# Patient Record
Sex: MALE | Race: WHITE | NOT HISPANIC OR LATINO | Employment: FULL TIME | URBAN - METROPOLITAN AREA
[De-identification: names, ages, dates, MRNs, and addresses within clinical notes are randomized per-mention and may not be internally consistent; named-entity substitution may affect disease eponyms.]

---

## 2023-09-14 ENCOUNTER — OFFICE VISIT (OUTPATIENT)
Dept: PRIMARY CARE | Facility: CLINIC | Age: 58
End: 2023-09-14
Payer: COMMERCIAL

## 2023-09-14 ENCOUNTER — LAB (OUTPATIENT)
Dept: LAB | Facility: LAB | Age: 58
End: 2023-09-14
Payer: COMMERCIAL

## 2023-09-14 VITALS
WEIGHT: 151.9 LBS | HEIGHT: 70 IN | OXYGEN SATURATION: 99 % | TEMPERATURE: 97.8 F | SYSTOLIC BLOOD PRESSURE: 120 MMHG | DIASTOLIC BLOOD PRESSURE: 80 MMHG | BODY MASS INDEX: 21.75 KG/M2 | HEART RATE: 71 BPM

## 2023-09-14 DIAGNOSIS — Z23 ENCOUNTER FOR IMMUNIZATION: ICD-10-CM

## 2023-09-14 DIAGNOSIS — I10 BENIGN ESSENTIAL HYPERTENSION: ICD-10-CM

## 2023-09-14 DIAGNOSIS — Z12.5 PROSTATE CANCER SCREENING: ICD-10-CM

## 2023-09-14 DIAGNOSIS — I10 BENIGN ESSENTIAL HYPERTENSION: Primary | ICD-10-CM

## 2023-09-14 PROBLEM — I51.7 LEFT VENTRICULAR HYPERTROPHY: Status: ACTIVE | Noted: 2023-09-14

## 2023-09-14 LAB
ALANINE AMINOTRANSFERASE (SGPT) (U/L) IN SER/PLAS: 21 U/L (ref 10–52)
ALBUMIN (G/DL) IN SER/PLAS: 4.6 G/DL (ref 3.4–5)
ALKALINE PHOSPHATASE (U/L) IN SER/PLAS: 77 U/L (ref 33–120)
ANION GAP IN SER/PLAS: 11 MMOL/L (ref 10–20)
ASPARTATE AMINOTRANSFERASE (SGOT) (U/L) IN SER/PLAS: 16 U/L (ref 9–39)
BASOPHILS (10*3/UL) IN BLOOD BY AUTOMATED COUNT: 0.05 X10E9/L (ref 0–0.1)
BASOPHILS/100 LEUKOCYTES IN BLOOD BY AUTOMATED COUNT: 0.8 % (ref 0–2)
BILIRUBIN TOTAL (MG/DL) IN SER/PLAS: 0.6 MG/DL (ref 0–1.2)
CALCIUM (MG/DL) IN SER/PLAS: 10.1 MG/DL (ref 8.6–10.6)
CARBON DIOXIDE, TOTAL (MMOL/L) IN SER/PLAS: 34 MMOL/L (ref 21–32)
CHLORIDE (MMOL/L) IN SER/PLAS: 99 MMOL/L (ref 98–107)
CHOLESTEROL (MG/DL) IN SER/PLAS: 223 MG/DL (ref 0–199)
CHOLESTEROL IN HDL (MG/DL) IN SER/PLAS: 51.6 MG/DL
CHOLESTEROL/HDL RATIO: 4.3
CREATININE (MG/DL) IN SER/PLAS: 1.09 MG/DL (ref 0.5–1.3)
EOSINOPHILS (10*3/UL) IN BLOOD BY AUTOMATED COUNT: 0.23 X10E9/L (ref 0–0.7)
EOSINOPHILS/100 LEUKOCYTES IN BLOOD BY AUTOMATED COUNT: 3.5 % (ref 0–6)
ERYTHROCYTE DISTRIBUTION WIDTH (RATIO) BY AUTOMATED COUNT: 11.9 % (ref 11.5–14.5)
ERYTHROCYTE MEAN CORPUSCULAR HEMOGLOBIN CONCENTRATION (G/DL) BY AUTOMATED: 33.9 G/DL (ref 32–36)
ERYTHROCYTE MEAN CORPUSCULAR VOLUME (FL) BY AUTOMATED COUNT: 96 FL (ref 80–100)
ERYTHROCYTES (10*6/UL) IN BLOOD BY AUTOMATED COUNT: 4.9 X10E12/L (ref 4.5–5.9)
GFR MALE: 80 ML/MIN/1.73M2
GLUCOSE (MG/DL) IN SER/PLAS: 85 MG/DL (ref 74–99)
HEMATOCRIT (%) IN BLOOD BY AUTOMATED COUNT: 47.2 % (ref 41–52)
HEMOGLOBIN (G/DL) IN BLOOD: 16 G/DL (ref 13.5–17.5)
IMMATURE GRANULOCYTES/100 LEUKOCYTES IN BLOOD BY AUTOMATED COUNT: 0.3 % (ref 0–0.9)
LDL: 151 MG/DL (ref 0–99)
LEUKOCYTES (10*3/UL) IN BLOOD BY AUTOMATED COUNT: 6.5 X10E9/L (ref 4.4–11.3)
LYMPHOCYTES (10*3/UL) IN BLOOD BY AUTOMATED COUNT: 1.31 X10E9/L (ref 1.2–4.8)
LYMPHOCYTES/100 LEUKOCYTES IN BLOOD BY AUTOMATED COUNT: 20.1 % (ref 13–44)
MONOCYTES (10*3/UL) IN BLOOD BY AUTOMATED COUNT: 0.52 X10E9/L (ref 0.1–1)
MONOCYTES/100 LEUKOCYTES IN BLOOD BY AUTOMATED COUNT: 8 % (ref 2–10)
NEUTROPHILS (10*3/UL) IN BLOOD BY AUTOMATED COUNT: 4.39 X10E9/L (ref 1.2–7.7)
NEUTROPHILS/100 LEUKOCYTES IN BLOOD BY AUTOMATED COUNT: 67.3 % (ref 40–80)
NRBC (PER 100 WBCS) BY AUTOMATED COUNT: 0 /100 WBC (ref 0–0)
PLATELETS (10*3/UL) IN BLOOD AUTOMATED COUNT: 273 X10E9/L (ref 150–450)
POTASSIUM (MMOL/L) IN SER/PLAS: 4 MMOL/L (ref 3.5–5.3)
PROSTATE SPECIFIC AG (NG/ML) IN SER/PLAS: 0.97 NG/ML (ref 0–4)
PROTEIN TOTAL: 7.9 G/DL (ref 6.4–8.2)
SODIUM (MMOL/L) IN SER/PLAS: 140 MMOL/L (ref 136–145)
TRIGLYCERIDE (MG/DL) IN SER/PLAS: 103 MG/DL (ref 0–149)
UREA NITROGEN (MG/DL) IN SER/PLAS: 13 MG/DL (ref 6–23)
VLDL: 21 MG/DL (ref 0–40)

## 2023-09-14 PROCEDURE — 3074F SYST BP LT 130 MM HG: CPT | Performed by: INTERNAL MEDICINE

## 2023-09-14 PROCEDURE — 1036F TOBACCO NON-USER: CPT | Performed by: INTERNAL MEDICINE

## 2023-09-14 PROCEDURE — 84153 ASSAY OF PSA TOTAL: CPT

## 2023-09-14 PROCEDURE — 90471 IMMUNIZATION ADMIN: CPT | Performed by: INTERNAL MEDICINE

## 2023-09-14 PROCEDURE — 3079F DIAST BP 80-89 MM HG: CPT | Performed by: INTERNAL MEDICINE

## 2023-09-14 PROCEDURE — 80061 LIPID PANEL: CPT

## 2023-09-14 PROCEDURE — 85025 COMPLETE CBC W/AUTO DIFF WBC: CPT

## 2023-09-14 PROCEDURE — 80053 COMPREHEN METABOLIC PANEL: CPT

## 2023-09-14 PROCEDURE — 99212 OFFICE O/P EST SF 10 MIN: CPT | Performed by: INTERNAL MEDICINE

## 2023-09-14 PROCEDURE — 90686 IIV4 VACC NO PRSV 0.5 ML IM: CPT | Performed by: INTERNAL MEDICINE

## 2023-09-14 PROCEDURE — 36415 COLL VENOUS BLD VENIPUNCTURE: CPT

## 2023-09-14 RX ORDER — VALSARTAN AND HYDROCHLOROTHIAZIDE 320; 12.5 MG/1; MG/1
1 TABLET, FILM COATED ORAL DAILY
COMMUNITY
Start: 2021-08-23 | End: 2023-09-14 | Stop reason: SDUPTHER

## 2023-09-14 RX ORDER — VALSARTAN AND HYDROCHLOROTHIAZIDE 320; 12.5 MG/1; MG/1
1 TABLET, FILM COATED ORAL DAILY
Qty: 90 TABLET | Refills: 3 | Status: SHIPPED | OUTPATIENT
Start: 2023-09-14 | End: 2024-09-13

## 2023-09-14 RX ORDER — AMLODIPINE BESYLATE 10 MG/1
10 TABLET ORAL DAILY
Qty: 90 TABLET | Refills: 3 | Status: SHIPPED | OUTPATIENT
Start: 2023-09-14 | End: 2024-09-13

## 2023-09-14 RX ORDER — AMLODIPINE BESYLATE 10 MG/1
1 TABLET ORAL DAILY
COMMUNITY
Start: 2021-06-29 | End: 2023-09-14 | Stop reason: SDUPTHER

## 2023-09-14 ASSESSMENT — PATIENT HEALTH QUESTIONNAIRE - PHQ9
2. FEELING DOWN, DEPRESSED OR HOPELESS: NOT AT ALL
1. LITTLE INTEREST OR PLEASURE IN DOING THINGS: NOT AT ALL
SUM OF ALL RESPONSES TO PHQ9 QUESTIONS 1 AND 2: 0

## 2023-09-14 NOTE — PROGRESS NOTES
Patient ID: Dwight Gore is a 58 y.o. male who presents for bp check  HPI  Taking  blood pressure medications.  Denies headache, dizziness, chest pain, shortness of breath or palpitation.  No exertional chest pain or dizziness or palpitation.  Has had eye exam within 1 year.  No blurred vision or double vision  Exercising  Following DASH diet  On tlc for lowering ldl    Review of Systems  GENERAL;:Denies weight changes,fatigue,fever,chills or sweats  HEENT:Denies headache,sorethroat,sinus drainage ,vision or hearing issues  CVS:No chest pain,sob or palpitation.No leg swelling  RESP:No c/c cough,cp,sob or wheezing  GI:NO abdominal pain,nausea,heartburn,vomiting,or bowel changes.  :No painful urination,frequency or hematuria.No incontinence  MSK:No joint pain or swelling  NEURO:No dizziness,weakness,numbness or tingling.No memory issues  PSYCH:No anxiety,depression or sleep issues       HISTORY  Social History     Socioeconomic History    Marital status: Single     Spouse name: Not on file    Number of children: Not on file    Years of education: Not on file    Highest education level: Not on file   Occupational History    Not on file   Tobacco Use    Smoking status: Never    Smokeless tobacco: Never   Substance and Sexual Activity    Alcohol use: Not Currently    Drug use: Never    Sexual activity: Not on file   Other Topics Concern    Not on file   Social History Narrative    Not on file     Social Determinants of Health     Financial Resource Strain: Not on file   Food Insecurity: Not on file   Transportation Needs: Not on file   Physical Activity: Not on file   Stress: Not on file   Social Connections: Not on file   Intimate Partner Violence: Not on file   Housing Stability: Not on file     No family history on file.  Past Medical History:   Diagnosis Date    Elevated blood-pressure reading, without diagnosis of hypertension 06/29/2021    Elevated blood pressure reading    Other specified health status     No  "known problems    Personal history of other diseases of the circulatory system 08/23/2021    History of uncontrolled hypertension     Past Surgical History:   Procedure Laterality Date    OTHER SURGICAL HISTORY  06/29/2021    Cataract surgery       Blood pressure 120/80, pulse 71, temperature 36.6 °C (97.8 °F), height 1.778 m (5' 10\"), weight 68.9 kg (151 lb 14.4 oz), SpO2 99 %.       Physical Exam  Gen. patient is not in acute distress  HEENT: No pallor or icterus.  Neck: Supple,no lAD,No JVD  CVS: S1, S2, regular in rate and rhythm. No peripheral edema.  Chest: Clear to auscultation bilateral. Good air entry.  Abd:Soft,nontender  Extremities no edema or tenderness.  Neuro: Grossly nonfocal, alert and oriented.  Psych: Mood and affect normal, good judgment and insight         Assessment/Plan   Problem List Items Addressed This Visit       Benign essential hypertension - Primary     Blood pressure is well controlled on treatment.  Continue same.  Check labs         Relevant Medications    amLODIPine (Norvasc) 10 mg tablet    valsartan-hydrochlorothiazide (Diovan-HCT) 320-12.5 mg tablet    Other Relevant Orders    CBC and Auto Differential    Comprehensive Metabolic Panel    Lipid Panel     Other Visit Diagnoses       Prostate cancer screening        Relevant Orders    PSA    Encounter for immunization        Relevant Orders    Flu vaccine (IIV4) age 6 months and greater, preservative free        Check lipid profile.  PSA for screening.  He declines colonoscopy or Cologuard at this time.  He will call me back when ready  Recommended to get Tdap and Shingrix.        Nathalia Christensen MD   "

## 2023-09-15 ENCOUNTER — TELEPHONE (OUTPATIENT)
Dept: PRIMARY CARE | Facility: CLINIC | Age: 58
End: 2023-09-15
Payer: COMMERCIAL

## 2023-09-15 NOTE — TELEPHONE ENCOUNTER
Left pt a VM to call back    Results     Advice blood work is ok except cholesterol and LDL cholesterol has gone up.Increase fiber in diet.Decrease saturated fats like oils,cheese,animal products,processed foods

## 2023-09-15 NOTE — RESULT ENCOUNTER NOTE
Advice blood work is ok except cholesterol and LDL cholesterol has gone up.Increase fiber in diet.Decrease saturated fats like oils,cheese,animal products,processed foods

## 2024-03-14 ENCOUNTER — APPOINTMENT (OUTPATIENT)
Dept: PRIMARY CARE | Facility: CLINIC | Age: 59
End: 2024-03-14
Payer: COMMERCIAL

## 2024-05-16 ENCOUNTER — OFFICE VISIT (OUTPATIENT)
Dept: PRIMARY CARE | Facility: CLINIC | Age: 59
End: 2024-05-16
Payer: COMMERCIAL

## 2024-05-16 VITALS
HEIGHT: 70 IN | HEART RATE: 85 BPM | SYSTOLIC BLOOD PRESSURE: 150 MMHG | WEIGHT: 154 LBS | DIASTOLIC BLOOD PRESSURE: 90 MMHG | BODY MASS INDEX: 22.05 KG/M2 | OXYGEN SATURATION: 96 %

## 2024-05-16 DIAGNOSIS — Z12.12 ENCOUNTER FOR COLORECTAL CANCER SCREENING: ICD-10-CM

## 2024-05-16 DIAGNOSIS — Z00.00 ANNUAL PHYSICAL EXAM: Primary | ICD-10-CM

## 2024-05-16 DIAGNOSIS — Z12.11 ENCOUNTER FOR COLORECTAL CANCER SCREENING: ICD-10-CM

## 2024-05-16 DIAGNOSIS — Z23 NEED FOR TDAP VACCINATION: ICD-10-CM

## 2024-05-16 PROCEDURE — 1036F TOBACCO NON-USER: CPT | Performed by: INTERNAL MEDICINE

## 2024-05-16 PROCEDURE — 90471 IMMUNIZATION ADMIN: CPT | Performed by: INTERNAL MEDICINE

## 2024-05-16 PROCEDURE — 3077F SYST BP >= 140 MM HG: CPT | Performed by: INTERNAL MEDICINE

## 2024-05-16 PROCEDURE — 3080F DIAST BP >= 90 MM HG: CPT | Performed by: INTERNAL MEDICINE

## 2024-05-16 PROCEDURE — 99396 PREV VISIT EST AGE 40-64: CPT | Performed by: INTERNAL MEDICINE

## 2024-05-16 PROCEDURE — 90715 TDAP VACCINE 7 YRS/> IM: CPT | Performed by: INTERNAL MEDICINE

## 2024-05-16 ASSESSMENT — PATIENT HEALTH QUESTIONNAIRE - PHQ9
SUM OF ALL RESPONSES TO PHQ9 QUESTIONS 1 AND 2: 0
1. LITTLE INTEREST OR PLEASURE IN DOING THINGS: NOT AT ALL
2. FEELING DOWN, DEPRESSED OR HOPELESS: NOT AT ALL

## 2024-05-16 NOTE — PROGRESS NOTES
"Subjective   Patient ID: Dwight Gore is a 58 y.o. male who presents for Annual Exam (BP check and medication check.).    HPI   Taking  blood pressure medications only 4 days a week had some dizziness and hence he made the change.  My recommendation was to cut amlodipine in half .  denies headache,  chest pain, shortness of breath or palpitation.  Has had eye exam within 1 year.  No blurred vision or double vision  Does yard work  Following DASH diet  On tlc for lowering ldl  Not done cologuard       Review of Systems  General: No significant change in weight, no fatigue, no fever, chills, or night sweats.  HEENT: No headache, dizziness, syncope. No blurred vision, double vision. No hearing loss, or ringing. No nasal discharge, sinus problems. No loose teeth, sore throat, hoarseness or neck stiffness.   Respiratory: No cough, mucous in throat, hemoptysis, wheezing, or shortness of breath. No snoring.  Cardiac: No chest pain, palpitations, orthopnea. No leg swelling or claudication pain.   Gastrointestinal: No indigestion, heartburn, nausea, vomiting, diarrhea, constipation, rectal bleeding or hemorrhoids.  Genitourinary: No UTI symptoms, stones, incontinence.No nocturia or hesitancy  Endocrine: No excess thirst or urination.  Hematopoietic: No anemia, easy bruising or bleeding. No history of blood transfusion.  Neuro: No localized weakness, numbness or tingling. No tremor, history of seizure. No history of memory problems.  Psychological: No anxiety, depression or sleep disturbance.   Objective   /90   Pulse 85   Ht 1.778 m (5' 10\")   Wt 69.9 kg (154 lb)   SpO2 96%   BMI 22.10 kg/m²     Physical Exam  Constitutional: Alert and in no acute distress. Well developed, well nourished.   Eyes: Normal external exam. Pupils were equal in size, round, reactive to light (PERRL) with normal accommodation and extraocular movements intact (EOMI).   Ears, Nose, Mouth, and Throat: Otoscopic examination: Normal.  Hearing: " Normal.    Neck: No neck mass was observed. Supple. Thyroid not enlarged and there were no palpable thyroid nodules.   Cardiovascular: Heart rate and rhythm were normal, normal S1 and S2, no gallops, no murmurs and no pericardial rub. Pedal pulses: Normal. No peripheral edema.   Pulmonary: No respiratory distress. Clear bilateral breath sounds.   Abdomen: Soft nontender; no abdominal mass palpated. No organomegaly. declined.   Genitourinary: declined.   Musculoskeletal: Gait and station: Normal. Has thickening of the palmar tendon with probable ganglion cyst on right side. Range of motion: Normal.  Muscle strength/tone: Normal.    Skin: No rash  Psychiatric: Judgment and insight: Intact. Mood and affect: Normal.   Lymphatic: No cervical lymphadenopathy.       Assessment/Plan   Problem List Items Addressed This Visit             ICD-10-CM    Annual physical exam - Primary Z00.00     Other Visit Diagnoses         Codes    Encounter for colorectal cancer screening     Z12.11, Z12.12    Relevant Orders    Cologuard® colon cancer screening    Need for Tdap vaccination     Z23    Relevant Orders    Tdap vaccine, age 7 years and older  (BOOSTRIX) (Completed)             Patient examined counseling completed  Cologuard ordered.  Declines colonoscopy  Recommended to establish healthcare power of   Tdap given  Recommended to start taking blood pressure medication daily.  Will make dose adjustments if he develops dizziness again  Start regular exercise  Will follow-up in 6 weeks

## 2024-06-27 ENCOUNTER — APPOINTMENT (OUTPATIENT)
Dept: PRIMARY CARE | Facility: CLINIC | Age: 59
End: 2024-06-27
Payer: COMMERCIAL

## 2024-07-10 ENCOUNTER — APPOINTMENT (OUTPATIENT)
Dept: PRIMARY CARE | Facility: CLINIC | Age: 59
End: 2024-07-10
Payer: COMMERCIAL

## 2024-07-23 ENCOUNTER — APPOINTMENT (OUTPATIENT)
Dept: PRIMARY CARE | Facility: CLINIC | Age: 59
End: 2024-07-23
Payer: COMMERCIAL

## 2024-07-23 VITALS
WEIGHT: 156.6 LBS | HEART RATE: 84 BPM | DIASTOLIC BLOOD PRESSURE: 80 MMHG | HEIGHT: 70 IN | BODY MASS INDEX: 22.42 KG/M2 | OXYGEN SATURATION: 95 % | SYSTOLIC BLOOD PRESSURE: 110 MMHG

## 2024-07-23 DIAGNOSIS — R42 DIZZINESS: Primary | ICD-10-CM

## 2024-07-23 DIAGNOSIS — I10 BENIGN ESSENTIAL HYPERTENSION: ICD-10-CM

## 2024-07-23 PROCEDURE — 3074F SYST BP LT 130 MM HG: CPT | Performed by: INTERNAL MEDICINE

## 2024-07-23 PROCEDURE — 3008F BODY MASS INDEX DOCD: CPT | Performed by: INTERNAL MEDICINE

## 2024-07-23 PROCEDURE — 99213 OFFICE O/P EST LOW 20 MIN: CPT | Performed by: INTERNAL MEDICINE

## 2024-07-23 PROCEDURE — 1036F TOBACCO NON-USER: CPT | Performed by: INTERNAL MEDICINE

## 2024-07-23 PROCEDURE — 3078F DIAST BP <80 MM HG: CPT | Performed by: INTERNAL MEDICINE

## 2024-07-23 RX ORDER — AMLODIPINE BESYLATE 5 MG/1
5 TABLET ORAL DAILY
Qty: 90 TABLET | Refills: 3 | Status: SHIPPED | OUTPATIENT
Start: 2024-07-23 | End: 2025-07-23

## 2024-07-23 RX ORDER — VALSARTAN AND HYDROCHLOROTHIAZIDE 320; 12.5 MG/1; MG/1
1 TABLET, FILM COATED ORAL DAILY
Qty: 90 TABLET | Refills: 3 | Status: SHIPPED | OUTPATIENT
Start: 2024-07-23 | End: 2025-07-23

## 2024-07-23 NOTE — PROGRESS NOTES
"Subjective   Patient ID: Dwight Gore is a 59 y.o. male who presents for Follow-up.    HPI   Taking both medications.  Still somewhat lightheaded.  No syncope.  Home numbers fluctuates.  Not very high  No vertigo  No chest pain palpitation or shortness of breath  Drinks enough fluids  Review of Systems  No fever chills or night sweats  No nausea vomiting or heartburn  No numbness tingling or weakness  Objective   /80   Pulse 84   Ht 1.778 m (5' 10\")   Wt 71 kg (156 lb 9.6 oz)   SpO2 95%   BMI 22.47 kg/m²     Physical Exam  Gen. patient is not in acute distress  HEENT: No pallor or icterus.  Neck: Supple,no lAD,No JVD  CVS: S1, S2, regular in rate and rhythm. No peripheral edema.  Chest: Clear to auscultation bilateral. Good air entry.  Abd:Soft,nontender  Extremities no edema or tenderness.  Neuro: Grossly nonfocal, alert and oriented.  Psych: Mood and affect normal, good judgment and insight  Assessment/Plan   Problem List Items Addressed This Visit             ICD-10-CM    Benign essential hypertension I10    Relevant Medications    amLODIPine (Norvasc) 5 mg tablet    valsartan-hydrochlorothiazide (Diovan-HCT) 320-12.5 mg tablet    Dizziness - Primary R42        Since he has ongoing dizziness, will decrease amlodipine to 5 mg.  Take amlodipine at night and valsartan HCT in the morning.  Will reassess in a few weeks  Call me with any issues in the meantime  "

## 2024-09-25 ENCOUNTER — LAB (OUTPATIENT)
Dept: LAB | Facility: LAB | Age: 59
End: 2024-09-25
Payer: COMMERCIAL

## 2024-09-25 ENCOUNTER — APPOINTMENT (OUTPATIENT)
Dept: PRIMARY CARE | Facility: CLINIC | Age: 59
End: 2024-09-25
Payer: COMMERCIAL

## 2024-09-25 VITALS
SYSTOLIC BLOOD PRESSURE: 128 MMHG | BODY MASS INDEX: 22.19 KG/M2 | WEIGHT: 155 LBS | HEART RATE: 76 BPM | OXYGEN SATURATION: 95 % | HEIGHT: 70 IN | DIASTOLIC BLOOD PRESSURE: 78 MMHG

## 2024-09-25 DIAGNOSIS — Z12.5 PROSTATE CANCER SCREENING: ICD-10-CM

## 2024-09-25 DIAGNOSIS — I10 BENIGN ESSENTIAL HYPERTENSION: ICD-10-CM

## 2024-09-25 DIAGNOSIS — E78.00 HYPERCHOLESTEREMIA: ICD-10-CM

## 2024-09-25 DIAGNOSIS — I10 BENIGN ESSENTIAL HYPERTENSION: Primary | ICD-10-CM

## 2024-09-25 DIAGNOSIS — E78.00 PURE HYPERCHOLESTEROLEMIA: ICD-10-CM

## 2024-09-25 PROBLEM — R42 DIZZINESS: Status: RESOLVED | Noted: 2024-07-23 | Resolved: 2024-09-25

## 2024-09-25 PROCEDURE — 80053 COMPREHEN METABOLIC PANEL: CPT

## 2024-09-25 PROCEDURE — 3078F DIAST BP <80 MM HG: CPT | Performed by: INTERNAL MEDICINE

## 2024-09-25 PROCEDURE — 3074F SYST BP LT 130 MM HG: CPT | Performed by: INTERNAL MEDICINE

## 2024-09-25 PROCEDURE — 80061 LIPID PANEL: CPT

## 2024-09-25 PROCEDURE — 90471 IMMUNIZATION ADMIN: CPT | Performed by: INTERNAL MEDICINE

## 2024-09-25 PROCEDURE — 36415 COLL VENOUS BLD VENIPUNCTURE: CPT

## 2024-09-25 PROCEDURE — 90656 IIV3 VACC NO PRSV 0.5 ML IM: CPT | Performed by: INTERNAL MEDICINE

## 2024-09-25 PROCEDURE — 85025 COMPLETE CBC W/AUTO DIFF WBC: CPT

## 2024-09-25 PROCEDURE — 1036F TOBACCO NON-USER: CPT | Performed by: INTERNAL MEDICINE

## 2024-09-25 PROCEDURE — 3008F BODY MASS INDEX DOCD: CPT | Performed by: INTERNAL MEDICINE

## 2024-09-25 PROCEDURE — 84153 ASSAY OF PSA TOTAL: CPT

## 2024-09-25 PROCEDURE — 99213 OFFICE O/P EST LOW 20 MIN: CPT | Performed by: INTERNAL MEDICINE

## 2024-09-25 ASSESSMENT — PATIENT HEALTH QUESTIONNAIRE - PHQ9
1. LITTLE INTEREST OR PLEASURE IN DOING THINGS: NOT AT ALL
SUM OF ALL RESPONSES TO PHQ9 QUESTIONS 1 AND 2: 0
2. FEELING DOWN, DEPRESSED OR HOPELESS: NOT AT ALL

## 2024-09-25 NOTE — PROGRESS NOTES
"  Patient ID: Dwight Gore is a 59 y.o. male who presents for   HPI  Taking  blood pressure medications.  Denies headache, dizziness, chest pain, shortness of breath or palpitation.  No exertional chest pain or dizziness or palpitation.  Has not  had eye exam within 1 year.  No blurred vision or double vision  Exercising  Following DASH diet     Review of Systems  GENERAL;:Denies weight changes,fatigue,fever,chills or sweats  HEENT:Denies headache,sorethroat,sinus drainage ,vision or hearing issues  CVS:No chest pain,sob or palpitation.No leg swelling  RESP:No c/c cough,cp,sob or wheezing  GI:NO abdominal pain,nausea,heartburn,vomiting,or bowel changes.  :No painful urination,frequency or hematuria.No incontinence  MSK:No joint pain or swelling  NEURO:No dizziness,weakness,numbness or tingling.No memory issues  PSYCH:No anxiety,depression or sleep issues       Blood pressure 128/78, pulse 76, height 1.778 m (5' 10\"), weight 70.3 kg (155 lb), SpO2 95%.       Physical Exam  Gen. patient is not in acute distress  HEENT: No pallor or icterus.  Neck: Supple,no lAD,No JVD  CVS: S1, S2, regular in rate and rhythm. No peripheral edema.  Chest: Clear to auscultation bilateral. Good air entry.  Abd:Soft,nontender  Extremities no edema or tenderness.  Neuro: Grossly nonfocal, alert and oriented.  Psych: Mood and affect normal, good judgment and insight         Assessment/Plan   Problem List Items Addressed This Visit       Benign essential hypertension - Primary    Relevant Orders    CBC and Auto Differential    Comprehensive Metabolic Panel    Hypercholesteremia    Relevant Orders    Lipid Panel     Other Visit Diagnoses       Prostate cancer screening        Relevant Orders    Prostate Specific Antigen    Pure hypercholesterolemia        Relevant Orders    CT cardiac scoring wo IV contrast            Blood pressure is controlled.  Check renal function  Continue same medication    Check lipid profile.  Will do CT scoring for " risk assessment    Risk and benefits of PSA screening discussed.  Order placed  Flu shot given  Follow-up in 6 months  [facility].  Nathalia Christensen MD

## 2024-09-26 DIAGNOSIS — R97.20 ELEVATED PSA: Primary | ICD-10-CM

## 2024-09-26 LAB
ALBUMIN SERPL BCP-MCNC: 4.7 G/DL (ref 3.4–5)
ALP SERPL-CCNC: 70 U/L (ref 33–120)
ALT SERPL W P-5'-P-CCNC: 20 U/L (ref 10–52)
ANION GAP SERPL CALC-SCNC: 17 MMOL/L (ref 10–20)
AST SERPL W P-5'-P-CCNC: 15 U/L (ref 9–39)
BASOPHILS # BLD AUTO: 0.06 X10*3/UL (ref 0–0.1)
BASOPHILS NFR BLD AUTO: 0.6 %
BILIRUB SERPL-MCNC: 0.8 MG/DL (ref 0–1.2)
BUN SERPL-MCNC: 12 MG/DL (ref 6–23)
CALCIUM SERPL-MCNC: 10.3 MG/DL (ref 8.6–10.6)
CHLORIDE SERPL-SCNC: 93 MMOL/L (ref 98–107)
CHOLEST SERPL-MCNC: 227 MG/DL (ref 0–199)
CHOLESTEROL/HDL RATIO: 4.5
CO2 SERPL-SCNC: 30 MMOL/L (ref 21–32)
CREAT SERPL-MCNC: 1.17 MG/DL (ref 0.5–1.3)
EGFRCR SERPLBLD CKD-EPI 2021: 73 ML/MIN/1.73M*2
EOSINOPHIL # BLD AUTO: 0.07 X10*3/UL (ref 0–0.7)
EOSINOPHIL NFR BLD AUTO: 0.7 %
ERYTHROCYTE [DISTWIDTH] IN BLOOD BY AUTOMATED COUNT: 11.5 % (ref 11.5–14.5)
GLUCOSE SERPL-MCNC: 90 MG/DL (ref 74–99)
HCT VFR BLD AUTO: 43.4 % (ref 41–52)
HDLC SERPL-MCNC: 50.5 MG/DL
HGB BLD-MCNC: 15 G/DL (ref 13.5–17.5)
IMM GRANULOCYTES # BLD AUTO: 0.03 X10*3/UL (ref 0–0.7)
IMM GRANULOCYTES NFR BLD AUTO: 0.3 % (ref 0–0.9)
LDLC SERPL CALC-MCNC: 156 MG/DL
LYMPHOCYTES # BLD AUTO: 1.64 X10*3/UL (ref 1.2–4.8)
LYMPHOCYTES NFR BLD AUTO: 16.6 %
MCH RBC QN AUTO: 32.3 PG (ref 26–34)
MCHC RBC AUTO-ENTMCNC: 34.6 G/DL (ref 32–36)
MCV RBC AUTO: 93 FL (ref 80–100)
MONOCYTES # BLD AUTO: 0.77 X10*3/UL (ref 0.1–1)
MONOCYTES NFR BLD AUTO: 7.8 %
NEUTROPHILS # BLD AUTO: 7.29 X10*3/UL (ref 1.2–7.7)
NEUTROPHILS NFR BLD AUTO: 74 %
NON HDL CHOLESTEROL: 177 MG/DL (ref 0–149)
NRBC BLD-RTO: 0 /100 WBCS (ref 0–0)
PLATELET # BLD AUTO: 335 X10*3/UL (ref 150–450)
POTASSIUM SERPL-SCNC: 4.2 MMOL/L (ref 3.5–5.3)
PROT SERPL-MCNC: 7.9 G/DL (ref 6.4–8.2)
PSA SERPL-MCNC: 6.29 NG/ML
RBC # BLD AUTO: 4.65 X10*6/UL (ref 4.5–5.9)
SODIUM SERPL-SCNC: 136 MMOL/L (ref 136–145)
TRIGL SERPL-MCNC: 105 MG/DL (ref 0–149)
VLDL: 21 MG/DL (ref 0–40)
WBC # BLD AUTO: 9.9 X10*3/UL (ref 4.4–11.3)

## 2024-09-26 NOTE — RESULT ENCOUNTER NOTE
Cholesterol is borderline high.complete the CT scorig test I ordered.    Psa for prostate cancer screening is high.Need to see Urologist for further recommendations.I have placed referral.please make appointment.call me with any questions.

## 2024-10-09 ENCOUNTER — LAB (OUTPATIENT)
Dept: LAB | Facility: LAB | Age: 59
End: 2024-10-09
Payer: COMMERCIAL

## 2024-10-09 ENCOUNTER — APPOINTMENT (OUTPATIENT)
Facility: CLINIC | Age: 59
End: 2024-10-09
Payer: COMMERCIAL

## 2024-10-09 VITALS
BODY MASS INDEX: 22.19 KG/M2 | SYSTOLIC BLOOD PRESSURE: 157 MMHG | HEART RATE: 96 BPM | WEIGHT: 155 LBS | HEIGHT: 70 IN | DIASTOLIC BLOOD PRESSURE: 95 MMHG

## 2024-10-09 DIAGNOSIS — R97.20 ELEVATED PSA: Primary | ICD-10-CM

## 2024-10-09 DIAGNOSIS — R31.29 MICROHEMATURIA: ICD-10-CM

## 2024-10-09 DIAGNOSIS — R97.20 ELEVATED PSA: ICD-10-CM

## 2024-10-09 LAB
RBC #/AREA URNS AUTO: NORMAL /HPF
SQUAMOUS #/AREA URNS AUTO: NORMAL /HPF
WBC #/AREA URNS AUTO: NORMAL /HPF

## 2024-10-09 PROCEDURE — 3077F SYST BP >= 140 MM HG: CPT | Performed by: STUDENT IN AN ORGANIZED HEALTH CARE EDUCATION/TRAINING PROGRAM

## 2024-10-09 PROCEDURE — 36415 COLL VENOUS BLD VENIPUNCTURE: CPT

## 2024-10-09 PROCEDURE — 3080F DIAST BP >= 90 MM HG: CPT | Performed by: STUDENT IN AN ORGANIZED HEALTH CARE EDUCATION/TRAINING PROGRAM

## 2024-10-09 PROCEDURE — 81001 URINALYSIS AUTO W/SCOPE: CPT

## 2024-10-09 PROCEDURE — 1036F TOBACCO NON-USER: CPT | Performed by: STUDENT IN AN ORGANIZED HEALTH CARE EDUCATION/TRAINING PROGRAM

## 2024-10-09 PROCEDURE — 3008F BODY MASS INDEX DOCD: CPT | Performed by: STUDENT IN AN ORGANIZED HEALTH CARE EDUCATION/TRAINING PROGRAM

## 2024-10-09 PROCEDURE — 99213 OFFICE O/P EST LOW 20 MIN: CPT | Performed by: STUDENT IN AN ORGANIZED HEALTH CARE EDUCATION/TRAINING PROGRAM

## 2024-10-09 PROCEDURE — 84154 ASSAY OF PSA FREE: CPT

## 2024-10-09 PROCEDURE — 84153 ASSAY OF PSA TOTAL: CPT

## 2024-10-09 NOTE — PROGRESS NOTES
Elevated PSA   Chief Complaint    Elevated PSA        Referring physician: Nathalia Christensen MD     SUBJECTIVE:  HPI:   Dwight Gore is a 59 y.o. male with a history of HTN, microscopic hematuria who presents with elevated PSA.  Here by himself.    PSA has been checked annually with PCP and has always been less than 1.  Latest PSA 9/25/24 was 6.29, referred for evaluation.    Mild LUTS driven by weak stream, not bothersome.  Mild ED - usually sufficient rigidity for intercourse but only active q1-2 months.  Difficulty with maintaining erections, denies premature ejaculation.    2021 had workup for microhematuria with Dr. Garcia, 13 RBC / hpf, JEFFREY and cystoscopy did not reveal any findings.  Never smoker.    Denies family history of prostate, other  malignancies.    IPSS 15 (primarily weak stream), QOL 1 (pleased)  LIMA 12 - happy with this at present    Past Medical History:   Diagnosis Date    Elevated blood-pressure reading, without diagnosis of hypertension 06/29/2021    Elevated blood pressure reading    Other specified health status     No known problems    Personal history of other diseases of the circulatory system 08/23/2021    History of uncontrolled hypertension        Past medical, surgical, family and social history in the chart was reviewed and is accurate including any additions to what is in this HPI.    ROS:  14-point review of systems negative except as noted above.    OBJECTIVE:  Visit Vitals  BP (!) 157/95   Pulse 96     Body mass index is 22.24 kg/m².  Physical Exam   General:  No acute distress  HEENT:  EOMI  CV:  Regular rate  Pulm:  Nonlabored respirations  Abd:  Soft, non-distended  :  No suprapubic or CVA tenderness  MSK:  No contractures  Neuro:  Motor intact  Psych:  Appropriate affect    Labs:  Lab Results   Component Value Date    WBC 9.9 09/25/2024    HGB 15.0 09/25/2024    HCT 43.4 09/25/2024     09/25/2024    CHOL 227 (H) 09/25/2024    TRIG 105 09/25/2024    HDL 50.5 09/25/2024  "   ALT 20 09/25/2024    AST 15 09/25/2024     09/25/2024    K 4.2 09/25/2024    CL 93 (L) 09/25/2024    CREATININE 1.17 09/25/2024    BUN 12 09/25/2024    CO2 30 09/25/2024    TSH 1.97 06/29/2021    PSA 6.29 (H) 09/25/2024     No results found for: \"URINECULTURE\"   Lab Results   Component Value Date    PSA 6.29 (H) 09/25/2024    PSA 0.97 09/14/2023    PSA 0.86 09/16/2022       IMAGING:  All imaging discussed in HPI was independently reviewed.    ASSESSMENT:  Elevated PSA  Microhematuria    Discussed role of PSA screening, interpretation of elevated PSA, workup and management.  Discussed role of repeat PSA, prostate MRI, prostate biopsy including +/- anesthesia and +/- MRI fusion.    PLAN:  MRI prostate  Repeat PSA with % free  Repeat UA with microscopy - if positive consider repeat workup  Follow-up 1 month    Gamal Prasad MD    Problem List Items Addressed This Visit    None  Visit Diagnoses       Elevated PSA    -  Primary    Relevant Orders    PSA, total and free    MR prostate with chace boundaries if pirads 3 or above    Microhematuria        Relevant Orders    Microscopic Only, Urine    Urinalysis with Reflex Culture and Microscopic             "

## 2024-10-11 LAB
PSA FREE MFR SERPL: 32 %
PSA FREE SERPL-MCNC: 0.6 NG/ML
PSA SERPL IA-MCNC: 1.9 NG/ML (ref 0–4)

## 2024-10-24 ENCOUNTER — TELEPHONE (OUTPATIENT)
Facility: CLINIC | Age: 59
End: 2024-10-24
Payer: COMMERCIAL

## 2024-10-24 NOTE — TELEPHONE ENCOUNTER
Left message 11/26 appt with dr hallman needs to be rescheduled as dr hallman will be out of the office that week.  Rescheduled to 11/21 at 11:30.  Direct number [phone] to confirm or reschedule

## 2024-11-08 ENCOUNTER — APPOINTMENT (OUTPATIENT)
Dept: RADIOLOGY | Facility: CLINIC | Age: 59
End: 2024-11-08
Payer: COMMERCIAL

## 2024-11-21 ENCOUNTER — APPOINTMENT (OUTPATIENT)
Facility: CLINIC | Age: 59
End: 2024-11-21
Payer: COMMERCIAL

## 2024-11-26 ENCOUNTER — APPOINTMENT (OUTPATIENT)
Facility: CLINIC | Age: 59
End: 2024-11-26
Payer: COMMERCIAL

## 2024-11-27 ENCOUNTER — APPOINTMENT (OUTPATIENT)
Dept: RADIOLOGY | Facility: CLINIC | Age: 59
End: 2024-11-27
Payer: COMMERCIAL

## 2025-03-25 ENCOUNTER — APPOINTMENT (OUTPATIENT)
Dept: PRIMARY CARE | Facility: CLINIC | Age: 60
End: 2025-03-25
Payer: COMMERCIAL

## 2025-03-25 VITALS
SYSTOLIC BLOOD PRESSURE: 146 MMHG | HEIGHT: 70 IN | DIASTOLIC BLOOD PRESSURE: 90 MMHG | OXYGEN SATURATION: 97 % | HEART RATE: 97 BPM | BODY MASS INDEX: 23.29 KG/M2 | WEIGHT: 162.7 LBS

## 2025-03-25 DIAGNOSIS — R25.2 LEG CRAMPS: ICD-10-CM

## 2025-03-25 DIAGNOSIS — E78.00 HYPERCHOLESTEREMIA: ICD-10-CM

## 2025-03-25 DIAGNOSIS — I10 BENIGN ESSENTIAL HYPERTENSION: Primary | ICD-10-CM

## 2025-03-25 PROCEDURE — 3080F DIAST BP >= 90 MM HG: CPT | Performed by: INTERNAL MEDICINE

## 2025-03-25 PROCEDURE — 1036F TOBACCO NON-USER: CPT | Performed by: INTERNAL MEDICINE

## 2025-03-25 PROCEDURE — 99213 OFFICE O/P EST LOW 20 MIN: CPT | Performed by: INTERNAL MEDICINE

## 2025-03-25 PROCEDURE — 3008F BODY MASS INDEX DOCD: CPT | Performed by: INTERNAL MEDICINE

## 2025-03-25 PROCEDURE — 3077F SYST BP >= 140 MM HG: CPT | Performed by: INTERNAL MEDICINE

## 2025-03-25 RX ORDER — VALSARTAN AND HYDROCHLOROTHIAZIDE 320; 12.5 MG/1; MG/1
1 TABLET, FILM COATED ORAL DAILY
Qty: 90 TABLET | Refills: 3 | Status: SHIPPED | OUTPATIENT
Start: 2025-03-25 | End: 2026-03-25

## 2025-03-25 RX ORDER — AMLODIPINE BESYLATE 5 MG/1
5 TABLET ORAL 2 TIMES DAILY
Qty: 180 TABLET | Refills: 3 | Status: SHIPPED | OUTPATIENT
Start: 2025-03-25 | End: 2026-03-25

## 2025-03-25 ASSESSMENT — PATIENT HEALTH QUESTIONNAIRE - PHQ9
1. LITTLE INTEREST OR PLEASURE IN DOING THINGS: NOT AT ALL
SUM OF ALL RESPONSES TO PHQ9 QUESTIONS 1 & 2: 0
2. FEELING DOWN, DEPRESSED OR HOPELESS: NOT AT ALL

## 2025-03-25 NOTE — PROGRESS NOTES
"  Patient ID: Dwight Gore is a 59 y.o. male who presents for follow up  HPI  aking  blood pressure medications.  Denies headache, dizziness, chest pain, shortness of breath or palpitation.  No exertional chest pain or dizziness or palpitation.  Has not  had eye exam within 1 year.  No blurred vision or double vision  Exercising  Following DASH diet   Home numbers normal  Get cramps in lower legs and feet.  On TLC to lower cholesterol    Rpt psa normal     Review of Systems  GENERAL;:Denies weight changes,fatigue,fever,chills or sweats  HEENT:Denies headache,sorethroat,sinus drainage ,vision or hearing issues  CVS:No chest pain,sob or palpitation.No leg swelling  RESP:No c/c cough,cp,sob or wheezing  GI:NO abdominal pain,nausea,heartburn,vomiting,or bowel changes.  :No painful urination,frequency or hematuria.No incontinence  MSK:No joint pain or swelling  NEURO:No dizziness,weakness,numbness or tingling.No memory issues  PSYCH:No anxiety,depression or sleep issues       Blood pressure 146/90, pulse 97, height 1.778 m (5' 10\"), weight 73.8 kg (162 lb 11.2 oz), SpO2 97%.       Physical Exam  Gen. patient is not in acute distress  HEENT: No pallor or icterus.  Neck: Supple,no lAD,No JVD  CVS: S1, S2, regular in rate and rhythm. No peripheral edema.  Chest: Clear to auscultation bilateral. Good air entry.  Abd:Soft,nontender  Extremities no edema or tenderness.  Neuro: Grossly nonfocal, alert and oriented.  Psych: Mood and affect normal, good judgment and insight         Assessment/Plan   Problem List Items Addressed This Visit       Benign essential hypertension - Primary    Relevant Medications    amLODIPine (Norvasc) 5 mg tablet    valsartan-hydrochlorothiazide (Diovan-HCT) 320-12.5 mg tablet    Hypercholesteremia    Leg cramps     Blood pressure is not at goal.  Increase amlodipine to twice daily.  Monitor BP and call me if higher than 130/80 at home  Improve hydration and take vitamin D3 1000 units daily.  Do " stretches.  Call me back for persisting issues  Follow up in 6 months.    Nathalia Christensen MD

## 2025-07-31 ENCOUNTER — APPOINTMENT (OUTPATIENT)
Dept: UROLOGY | Facility: CLINIC | Age: 60
End: 2025-07-31
Payer: COMMERCIAL

## 2025-09-25 ENCOUNTER — APPOINTMENT (OUTPATIENT)
Dept: PRIMARY CARE | Facility: CLINIC | Age: 60
End: 2025-09-25
Payer: COMMERCIAL

## 2025-09-26 ENCOUNTER — APPOINTMENT (OUTPATIENT)
Dept: PRIMARY CARE | Facility: CLINIC | Age: 60
End: 2025-09-26
Payer: COMMERCIAL